# Patient Record
Sex: MALE | Race: WHITE | Employment: OTHER | URBAN - METROPOLITAN AREA
[De-identification: names, ages, dates, MRNs, and addresses within clinical notes are randomized per-mention and may not be internally consistent; named-entity substitution may affect disease eponyms.]

---

## 2017-08-10 ENCOUNTER — HOSPITAL ENCOUNTER (EMERGENCY)
Age: 67
Discharge: HOME | End: 2017-08-10
Attending: FAMILY MEDICINE

## 2017-08-10 VITALS
TEMPERATURE: 98 F | WEIGHT: 180.8 LBS | HEIGHT: 68 IN | OXYGEN SATURATION: 95 % | SYSTOLIC BLOOD PRESSURE: 177 MMHG | RESPIRATION RATE: 14 BRPM | DIASTOLIC BLOOD PRESSURE: 89 MMHG | HEART RATE: 84 BPM | BODY MASS INDEX: 27.4 KG/M2

## 2017-08-10 DIAGNOSIS — S61.412A LACERATION OF LEFT HAND WITHOUT FOREIGN BODY, INITIAL ENCOUNTER: Primary | ICD-10-CM

## 2017-08-10 RX ORDER — DOXYCYCLINE 100 MG/1
100 TABLET ORAL 2 TIMES DAILY
Qty: 20 TAB | Refills: 0 | Status: SHIPPED | OUTPATIENT
Start: 2017-08-10 | End: 2017-08-21

## 2017-08-10 NOTE — UC PROVIDER NOTE
Patient is a 69 y.o. male presenting with skin laceration. The history is provided by the patient. Laceration    The incident occurred 6 to 12 hours ago. The laceration is located on the left hand. The laceration is 3 cm in size. The injury mechanism is other. Foreign body present: no. The pain is mild. Pertinent negatives include no numbness, no tingling, no loss of motion and no coolness. The patient's last tetanus shot was 5 to 10 years ago. Past Medical History:   Diagnosis Date    Advanced care planning/counseling discussion 5/12/16    Alcohol dependence ([facility] [address] 8/28/2009    Arthritis 8/28/2009    Dr. Navarro Bragg (rheum) - gout related, in past RA suspected    B12 deficiency 6/2012    Chronic renal insufficiency, stage III (moderate) 8/28/2009    Dyslipidemia 8/28/2009    ED (erectile dysfunction) 8/28/2009    Elevated LFT's 10/28/2009    Gout, unspecified 8/28/2009    Heart palpitations 8/28/2009    Hip pain     left     History of nonmelanoma skin cancer 12/15    Left arm- Dr. Roma Mejia and Dr. Marcos Carlson    Hypertension     Insomnia 8/28/2009    Squamous cell carcinoma ([facility] [address] 5/5/16    Right arm - Dr. Roma Mejia and Dr. Kvng Le Tinnitus 8/28/2009        Past Surgical History:   Procedure Laterality Date    ABDOMEN SURGERY PROC UNLISTED  2012    RIGHT ING. HERNIA    HX COLONOSCOPY  2010    HX HIP REPLACEMENT Left     left    HX ORTHOPAEDIC  [id number]    back surgery LUMBAR    HX ORTHOPAEDIC      EXC GANGLION YSABEL. FEET    HX OTHER SURGICAL  5/5/16    Skin cancer removed from Right arm         Family History   Problem Relation Age of Onset    Cancer Mother      CLL    Diabetes Mother     Heart Attack Father     Stroke Father         Social History     Social History    Marital status:      Spouse name: N/A    Number of children: N/A    Years of education: N/A     Occupational History    retired .         Social History Main Topics    Smoking status: Former Smoker Packs/day: 1.50     Years: 15.00     Types: Cigarettes     Quit date: 1/1/1983    Smokeless tobacco: Never Used    Alcohol use 6.0 oz/week     10 Cans of beer per week    Drug use: No    Sexual activity: Not on file     Other Topics Concern    Not on file     Social History Narrative                ALLERGIES: Pcn [penicillins] and Penicillin g    Review of Systems   Constitutional: Negative for activity change. Skin: Positive for wound. Neurological: Negative for tingling and numbness. Vitals:    08/10/17 1046   BP: 177/89   Pulse: 84   Resp: 14   Temp: 98 °F (36.7 °C)   SpO2: 95%   Weight: 82 kg (180 lb 12.8 oz)   Height: 5' 8\" (1.727 m)       Physical Exam   Constitutional: He is oriented to person, place, and time. He appears well-developed and well-nourished. Eyes: EOM are normal.   Pulmonary/Chest: Effort normal.   Neurological: He is alert and oriented to person, place, and time. Skin: Skin is warm and dry. 3 cm laceration on dorsum of left hand   Psychiatric: He has a normal mood and affect. His behavior is normal. Judgment and thought content normal.   Nursing note and vitals reviewed. MDM     Differential Diagnosis; Clinical Impression; Plan:     CLINICAL IMPRESSION:  Laceration of left hand without foreign body, initial encounter  (primary encounter diagnosis)    Plan:  1. Laceration repaired  2. doxycycline  3. Risk of Significant Complications, Morbidity, and/or Mortality:   Presenting problems: Moderate  Diagnostic procedures: Moderate  Management options:   Moderate  Progress:   Patient progress:  Stable      Wound Repair  Date/Time: 8/10/2017 11:45 AM  Location details: left hand  Wound length:2.6 - 7.5 cm  Anesthesia: local infiltration    Anesthesia:  Anesthesia: local infiltration  Local Anesthetic: lidocaine 1% with epinephrine   Foreign bodies: no foreign bodies  Irrigation solution: saline  Debridement: none  Skin closure: 4-0 nylon  Number of sutures: 5  Technique: simple and interrupted  Approximation: loose  My total time at bedside, performing this procedure was 1-15 minutes.

## 2017-08-10 NOTE — DISCHARGE INSTRUCTIONS

## 2017-08-21 ENCOUNTER — HOSPITAL ENCOUNTER (EMERGENCY)
Age: 67
Discharge: HOME | End: 2017-08-21
Attending: FAMILY MEDICINE

## 2017-08-21 VITALS
RESPIRATION RATE: 16 BRPM | DIASTOLIC BLOOD PRESSURE: 88 MMHG | OXYGEN SATURATION: 94 % | WEIGHT: 186 LBS | TEMPERATURE: 97.4 F | SYSTOLIC BLOOD PRESSURE: 156 MMHG | BODY MASS INDEX: 28.19 KG/M2 | HEART RATE: 83 BPM | HEIGHT: 68 IN

## 2017-08-21 DIAGNOSIS — S61.412D LACERATION OF LEFT HAND WITHOUT FOREIGN BODY, SUBSEQUENT ENCOUNTER: ICD-10-CM

## 2017-08-21 DIAGNOSIS — Z48.02 VISIT FOR SUTURE REMOVAL: Primary | ICD-10-CM

## 2017-08-21 RX ORDER — DOXYCYCLINE 100 MG/1
100 TABLET ORAL 2 TIMES DAILY
Qty: 8 TAB | Refills: 0 | Status: SHIPPED | OUTPATIENT
Start: 2017-08-21 | End: 2017-08-25

## 2017-08-21 NOTE — DISCHARGE INSTRUCTIONS
Learning About Stitches and Staples Removal  When are stitches and staples removed? Your doctor will tell you when to have your stitches or staples removed, usually in 7 to 14 days. How long you'll be told to wait will depend on things like where the wound is located, how big and how deep the wound is, and what your general health is like. Do not remove the stitches on your own. Stitches on the face are usually removed within a week. But stitches and staples on other areas of the body, such as on the back or belly or over a joint, may need to stay in place longer, often a week or two. Be sure to follow your doctor's instructions. How are stitches and staples removed? It usually doesn't hurt when the doctor removes the stitches or staples. You may feel a tug as each stitch or staple is removed. · You will either be seated or lying down. · To remove stitches, the doctor will use scissors to cut each of the knots and then pull the threads out. · To remove staples, the doctor will use a tool to take out the staples one at a time. · The area may still feel tender after the stitches or staples are gone. But it should feel better within a few minutes or up to a few hours. What can you expect after stitches and staples are removed? Depending on the type and location of the cut, you will have a scar. Scars usually fade over time. Keep the area clean, but you won't need a bandage. When should you call for help? Call your doctor now or seek immediate medical care if:  · You have new pain, or your pain gets worse. · You have trouble moving the area near the scar. · You have symptoms of infection, such as:  ¨ Increased pain, swelling, warmth, or redness around the scar. ¨ Red streaks leading from the scar. ¨ Pus draining from the scar. ¨ A fever. Watch closely for changes in your health, and be sure to contact your doctor if:  · The scar opens. · You do not get better as expected.   Follow-up care is a key part of your treatment and safety. Be sure to make and go to all appointments, and call your doctor if you do not get better as expected. It's also a good idea to keep a list of the medicines you take. Where can you learn more? Go to http://rashaun-nilam.info/. Enter E118 in the search box to learn more about \"Learning About Stitches and Staples Removal.\"  Current as of: March 20, 2017  Content Version: 11.3  © 1106-3447 [employer]. Care instructions adapted under license by [employer] (which disclaims liability or warranty for this information). If you have questions about a medical condition or this instruction, always ask your healthcare professional. [address] any warranty or liability for your use of this information.

## 2017-08-21 NOTE — UC PROVIDER NOTE
HPI Comments: Gabo Sanchez had 5 sutures placed on left hand due to laceration sustained 10 days ago. On doxycycline for ppx. No fever. The history is provided by the patient. Past Medical History:   Diagnosis Date    Advanced care planning/counseling discussion 5/12/16    Alcohol dependence ([facility] [address] 8/28/2009    Arthritis 8/28/2009    Dr. Kimberly Bragg (rheum) - gout related, in past RA suspected    B12 deficiency 6/2012    Chronic renal insufficiency, stage III (moderate) 8/28/2009    Dyslipidemia 8/28/2009    ED (erectile dysfunction) 8/28/2009    Elevated LFT's 10/28/2009    Gout, unspecified 8/28/2009    Heart palpitations 8/28/2009    Hip pain     left     History of nonmelanoma skin cancer 12/15    Left arm- Dr. Ruma Zuniga and Dr. Tello Rudolph    Hypertension     Insomnia 8/28/2009    Squamous cell carcinoma ([facility] [address] 5/5/16    Right arm - Dr. Ruma Zuniga and Dr. Leti Zee Tinnitus 8/28/2009        Past Surgical History:   Procedure Laterality Date    ABDOMEN SURGERY PROC UNLISTED  2012    RIGHT ING. HERNIA    HX COLONOSCOPY  2010    HX HIP REPLACEMENT Left     left    HX ORTHOPAEDIC  [id number]    back surgery LUMBAR    HX ORTHOPAEDIC      EXC GANGLION YSABEL. FEET    HX OTHER SURGICAL  5/5/16    Skin cancer removed from Right arm         Family History   Problem Relation Age of Onset    Cancer Mother      CLL    Diabetes Mother     Heart Attack Father     Stroke Father         Social History     Social History    Marital status:      Spouse name: N/A    Number of children: N/A    Years of education: N/A     Occupational History    retired .         Social History Main Topics    Smoking status: Former Smoker     Packs/day: 1.50     Years: 15.00     Types: Cigarettes     Quit date: 1/1/1983    Smokeless tobacco: Never Used    Alcohol use 6.0 oz/week     10 Cans of beer per week    Drug use: No    Sexual activity: Not on file     Other Topics Concern    Not on file     Social History Narrative                ALLERGIES: Pcn [penicillins] and Penicillin g    Review of Systems   Constitutional: Negative for chills and fever. Respiratory: Negative for shortness of breath and wheezing. Cardiovascular: Negative for chest pain and palpitations. Gastrointestinal: Negative for nausea and vomiting. Musculoskeletal: Negative for myalgias. Skin: Positive for wound. Neurological: Positive for headaches. Negative for dizziness. Vitals:    08/21/17 1612   BP: 156/88   Pulse: 83   Resp: 16   Temp: 97.4 °F (36.3 °C)   SpO2: 94%   Weight: 84.4 kg (186 lb)   Height: 5' 8\" (1.727 m)       Physical Exam   Constitutional: He appears well-developed and well-nourished. No distress. Neurological: He is alert. Skin: He is not diaphoretic. L-dorsal hand: 1cm laceration with 5 intact sutures   Psychiatric: He has a normal mood and affect. His behavior is normal. Judgment and thought content normal.   Nursing note and vitals reviewed. MDM     Differential Diagnosis; Clinical Impression; Plan:     CLINICAL IMPRESSION:  Visit for suture removal  (primary encounter diagnosis)  Laceration of left hand without foreign body, subsequent encounter    Plan:  1. 4 additional days of doxycycline  2. Allow steristrip to fall off  3. pcp as needed  Risk of Significant Complications, Morbidity, and/or Mortality:   Presenting problems: Moderate  Management options: Moderate  Progress:   Patient progress:  Stable      Suture/Staple Removal  Date/Time: 8/21/2017 5:59 PM  Performed by: Jocelyn Negro  Authorized by: Jocelyn Negro     Consent:     Consent obtained:  Verbal  Location:     Location:  Upper extremity    Upper extremity location:  Hand  Procedure details:     Wound appearance:  Good wound healing and moist    Number of sutures removed:  5  Post-procedure details:     Post-removal:  Steri-Strips applied    Patient tolerance of procedure:   Tolerated well, no immediate complications